# Patient Record
Sex: FEMALE | Race: WHITE | NOT HISPANIC OR LATINO | URBAN - METROPOLITAN AREA
[De-identification: names, ages, dates, MRNs, and addresses within clinical notes are randomized per-mention and may not be internally consistent; named-entity substitution may affect disease eponyms.]

---

## 2023-07-06 ENCOUNTER — APPOINTMENT (OUTPATIENT)
Age: 53
Setting detail: DERMATOLOGY
End: 2023-07-07

## 2023-07-06 DIAGNOSIS — L98.8 OTHER SPECIFIED DISORDERS OF THE SKIN AND SUBCUTANEOUS TISSUE: ICD-10-CM

## 2023-07-06 PROCEDURE — OTHER COUNSELING: OTHER

## 2023-07-06 PROCEDURE — OTHER DYSPORT: OTHER

## 2023-07-06 NOTE — PROCEDURE: DYSPORT
Show Inventory Tab: Show
Lcl Root Units: 0
Show Additional Area 1: Yes
Additional Area 1 Location: lips
Glabellar Complex Units: 15
Show Right And Left Pupillary Line Units: No
Dilution (U/0.1 Cc): 10
Additional Area 2 Location: [facility]
Additional Area 1 Units: 20
Forehead Units: 30
Additional Area 2 Units: 5
Detail Level: Detailed
Post-Care Instructions: Patient instructed to not lie down for 4 hours and limit physical activity for 24 hours.
Consent: Written consent obtained. Risks include but not limited to lid/brow ptosis, bruising, swelling, diplopia, temporary effect, incomplete chemical denervation.